# Patient Record
Sex: MALE | Race: WHITE | NOT HISPANIC OR LATINO | Employment: FULL TIME | URBAN - NONMETROPOLITAN AREA
[De-identification: names, ages, dates, MRNs, and addresses within clinical notes are randomized per-mention and may not be internally consistent; named-entity substitution may affect disease eponyms.]

---

## 2021-03-10 ENCOUNTER — IMMUNIZATION (OUTPATIENT)
Dept: VACCINE CLINIC | Facility: HOSPITAL | Age: 66
End: 2021-03-10

## 2021-03-10 PROCEDURE — 0001A: CPT | Performed by: INTERNAL MEDICINE

## 2021-03-10 PROCEDURE — 91300 HC SARSCOV02 VAC 30MCG/0.3ML IM: CPT | Performed by: INTERNAL MEDICINE

## 2021-03-31 ENCOUNTER — IMMUNIZATION (OUTPATIENT)
Dept: VACCINE CLINIC | Facility: HOSPITAL | Age: 66
End: 2021-03-31

## 2021-03-31 PROCEDURE — 91300 HC SARSCOV02 VAC 30MCG/0.3ML IM: CPT | Performed by: INTERNAL MEDICINE

## 2021-03-31 PROCEDURE — 0002A: CPT | Performed by: INTERNAL MEDICINE

## 2021-08-12 ENCOUNTER — OFFICE VISIT (OUTPATIENT)
Dept: PULMONOLOGY | Facility: CLINIC | Age: 66
End: 2021-08-12

## 2021-08-12 VITALS
HEART RATE: 54 BPM | DIASTOLIC BLOOD PRESSURE: 72 MMHG | BODY MASS INDEX: 29.35 KG/M2 | SYSTOLIC BLOOD PRESSURE: 116 MMHG | OXYGEN SATURATION: 98 % | WEIGHT: 205 LBS | HEIGHT: 70 IN | RESPIRATION RATE: 18 BRPM

## 2021-08-12 DIAGNOSIS — G47.19 EXCESSIVE DAYTIME SLEEPINESS: ICD-10-CM

## 2021-08-12 DIAGNOSIS — J30.89 OTHER ALLERGIC RHINITIS: ICD-10-CM

## 2021-08-12 DIAGNOSIS — F17.200 SMOKING: ICD-10-CM

## 2021-08-12 DIAGNOSIS — Z87.891 PERSONAL HISTORY OF TOBACCO USE, PRESENTING HAZARDS TO HEALTH: ICD-10-CM

## 2021-08-12 DIAGNOSIS — G47.33 OBSTRUCTIVE SLEEP APNEA: Primary | ICD-10-CM

## 2021-08-12 DIAGNOSIS — R06.83 SNORING: ICD-10-CM

## 2021-08-12 PROCEDURE — 99204 OFFICE O/P NEW MOD 45 MIN: CPT | Performed by: INTERNAL MEDICINE

## 2021-08-12 RX ORDER — ATORVASTATIN CALCIUM 20 MG/1
TABLET, FILM COATED ORAL
COMMUNITY
Start: 2021-08-01 | End: 2022-11-29

## 2021-08-12 RX ORDER — AZELASTINE 1 MG/ML
1 SPRAY, METERED NASAL 2 TIMES DAILY PRN
Qty: 1 EACH | Refills: 5 | Status: SHIPPED | OUTPATIENT
Start: 2021-08-12 | End: 2022-11-29

## 2021-08-12 RX ORDER — ALFUZOSIN HYDROCHLORIDE 10 MG/1
10 TABLET, EXTENDED RELEASE ORAL DAILY
COMMUNITY
Start: 2021-07-02

## 2021-08-12 NOTE — PROGRESS NOTES
CONSULT NOTE    Requested by:   Shima Smith DO Kendrick, Casandra R, DO      Chief Complaint   Patient presents with   • Consult   • Sleeping Problem       Subjective:  Serge Rogers is a 66 y.o. male.     History of Present Illness   Patient came in today for evaluation of possible sleep apnea. Patient says that for the past few years he snores loudly.    he feels that he gets restful night sleep and his quality of sleep is pretty good.     he is not complaining of occasional headaches. Patient denies having nights when he has nightmares or when he sleep talks.     There is no known family history of sleep apnea.    his Epsworth Sleepiness score was 5 /24.     Patient does not suffer from hypertension or diabetes.     Smokes 1 PPD for about 35 years and still smokes.     He is a mouth breather.     Patient also complains of runny nose and dribbling in the back of the throat for the past few months. This has been sometimes associated with seasonal variation.      The following portions of the patient's history were reviewed and updated as appropriate: allergies, current medications, past family history, past medical history, past social history and past surgical history.    Review of Systems   Constitutional: Negative for chills, fatigue and fever.   HENT: Positive for sinus pressure and sneezing. Negative for sore throat.    Respiratory: Positive for cough. Negative for chest tightness, shortness of breath and wheezing.    Cardiovascular: Negative for palpitations and leg swelling.   Psychiatric/Behavioral: Positive for sleep disturbance.   All other systems reviewed and are negative.      History reviewed. No pertinent past medical history.    Social History     Tobacco Use   • Smoking status: Current Every Day Smoker     Packs/day: 1.00     Years: 15.00     Pack years: 15.00     Types: Cigarettes   • Smokeless tobacco: Never Used   • Tobacco comment: on and off 45 years   Substance Use Topics   •  "Alcohol use: Not on file         Objective:  Visit Vitals  /72   Pulse 54   Resp 18   Ht 177.8 cm (70\")   Wt 93 kg (205 lb)   SpO2 98%   BMI 29.41 kg/m²       Physical Exam  Vitals reviewed.   Constitutional:       Appearance: He is well-developed.   HENT:      Head: Atraumatic.      Nose:      Comments: Nasal erythema noted.     Mouth/Throat:      Comments: Oropharynx was somewhat cobblestoned.  Eyes:      Pupils: Pupils are equal, round, and reactive to light.   Neck:      Thyroid: No thyromegaly.      Vascular: No JVD.      Trachea: No tracheal deviation.      Comments: Increased adipose tissue.   Cardiovascular:      Rate and Rhythm: Normal rate and regular rhythm.   Pulmonary:      Effort: Pulmonary effort is normal. No respiratory distress.      Breath sounds: Normal breath sounds. No wheezing.   Musculoskeletal:         General: Normal range of motion.      Comments: Gait was normal.   Skin:     General: Skin is warm and dry.   Neurological:      Mental Status: He is alert and oriented to person, place, and time.   Psychiatric:         Behavior: Behavior normal.           Assessment/Plan:  Diagnoses and all orders for this visit:    1. Obstructive sleep apnea (Primary)  -     Home Sleep Study; Future    2. Snoring  -     Home Sleep Study; Future    3. Excessive daytime sleepiness  -     Home Sleep Study; Future    4. Personal history of tobacco use, presenting hazards to health    5. Smoking    6. Other allergic rhinitis    Other orders  -     azelastine (ASTELIN) 0.1 % nasal spray; 1 spray into the nostril(s) as directed by provider 2 (Two) Times a Day As Needed for Rhinitis or Allergies. Use in each nostril as directed  Dispense: 1 each; Refill: 5        Return in about 4 months (around 12/12/2021) for Sleep study, Arlene/Mary.    DISCUSSION(if any):  Referring physicians office note was also reviewed that did mention sleep apnea.    ===========================  " ===========================    Sleep questionnaire was reviewed with the patient    The pathophysiology of sleep apnea was discussed, with the patient.     We will encourage him to schedule the sleep study soon.     Since his symptoms are not entirely classic for sleep apnea, we will only do a home sleep study and if negative, no lab study may be needed.     The patient is agreeable to try CPAP/BiPAP, if needed.     Patient was educated on good sleep hygiene measures and voiced understanding of the same.     Patient was given reading material regarding sleep apnea    Patient was strongly encouraged to quit smoking as soon as possible.    Patient will be started on nasal spray for symptoms which are definitely consistent with allergic rhinitis.     The patient belongs to the risk group for which lung cancer screening has been recommended. I have made the patient aware of my strong recommendation to discuss this further with primary care provider.        Dictated utilizing Dragon dictation.    This document was electronically signed by Gisela Villeda MD on 08/12/21 at 12:00 EDT

## 2022-11-29 ENCOUNTER — OFFICE VISIT (OUTPATIENT)
Dept: GASTROENTEROLOGY | Facility: CLINIC | Age: 67
End: 2022-11-29

## 2022-11-29 VITALS
HEART RATE: 71 BPM | WEIGHT: 194 LBS | BODY MASS INDEX: 27.77 KG/M2 | OXYGEN SATURATION: 98 % | SYSTOLIC BLOOD PRESSURE: 132 MMHG | HEIGHT: 70 IN | DIASTOLIC BLOOD PRESSURE: 80 MMHG

## 2022-11-29 DIAGNOSIS — R13.19 ESOPHAGEAL DYSPHAGIA: Primary | ICD-10-CM

## 2022-11-29 DIAGNOSIS — Z86.010 HISTORY OF COLON POLYPS: ICD-10-CM

## 2022-11-29 PROCEDURE — 99204 OFFICE O/P NEW MOD 45 MIN: CPT | Performed by: PHYSICIAN ASSISTANT

## 2022-11-29 RX ORDER — LOSARTAN POTASSIUM 50 MG/1
TABLET ORAL
COMMUNITY
Start: 2022-06-01 | End: 2022-11-29

## 2022-11-29 RX ORDER — LOSARTAN POTASSIUM 50 MG/1
50 TABLET ORAL DAILY
COMMUNITY
Start: 2022-09-08

## 2022-11-29 RX ORDER — SODIUM CHLORIDE 9 MG/ML
30 INJECTION, SOLUTION INTRAVENOUS CONTINUOUS PRN
Status: CANCELLED | OUTPATIENT
Start: 2022-11-29

## 2022-11-29 RX ORDER — MULTIPLE VITAMINS W/ MINERALS TAB 9MG-400MCG
1 TAB ORAL DAILY
COMMUNITY

## 2022-11-29 RX ORDER — ATORVASTATIN CALCIUM 10 MG/1
10 TABLET, FILM COATED ORAL NIGHTLY
COMMUNITY
Start: 2022-11-02

## 2022-11-29 NOTE — PROGRESS NOTES
"     New Patient Consult      Date: 2022   Patient Name: Serge Rogers  MRN: [id number]  : 1955     Primary Care Provider: Shima Smith DO  Referring Provider: Luis    Chief Complaint   Patient presents with   • Difficulty Swallowing     History of Present Illness: Serge Rogers is a 67 y.o. male who is here today as a consultation with Gastroenterology for evaluation of Difficulty Swallowing.    He has noticed dysphagia for the past approx 20 years, this only occurs intermittently and is about the same as when it started. Has noticed that this seems to occur with dry meats or uncooked vegetables. It does sometimes occur more often than others but then may not occur for months. He feels the sensation at the base of his neck. It feels \"full\" there at times and like the food stops. Most of the time, it will go on down without problem but other times, he will have regurgitation of the food. Denies any significant reflux or heartburn. Has this very rarely depending on his diet.     No previous EGD. Had recent labs with PCP recently and told all normal. No history of any liver disease. No anemia. No current abdominal pain. Appetite is good. Weight is stable. He generally has a healthy low cholesterol diet and exercises daily (walks approx 2 miles per day). He is a current smoker. Drinks alcohol regularly but not daily. No reported history of alcoholism.     Last colonoscopy was in 2018, was reported as normal. Does have a personal history of colon polyps when he had his initial colonoscopy. He was told to repeat in 10 years. There is no known family history of colon cancer or colon polyps.    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   • RETINAL " LASER PROCEDURE      torn retina     Family History   Problem Relation Age of Onset   • Cancer Mother         breast   • Hypertension Mother    • Heart disease Mother      Social History     Socioeconomic History   • Marital status:    Tobacco Use   • Smoking status: Every Day     Packs/day: 1.00     Years: 30.00     Pack years: 30.00     Types: Cigarettes     Start date: 8/1/1974   • Smokeless tobacco: Never   • Tobacco comments:     Smoked 10 years, quit 12 years, [id number] years, quit 7 years, smoked last 10 years   Vaping Use   • Vaping Use: Never used   Substance and Sexual Activity   • Alcohol use: Yes     Alcohol/week: 5.0 standard drinks     Types: 3 Cans of beer, 2 Shots of liquor per week   • Drug use: Never   • Sexual activity: Defer     Current Outpatient Medications:   •  alfuzosin (UROXATRAL) 10 MG 24 hr tablet, , Disp: , Rfl:   •  atorvastatin (LIPITOR) 10 MG tablet, , Disp: , Rfl:   •  losartan (COZAAR) 50 MG tablet, , Disp: , Rfl:   •  multivitamin with minerals tablet tablet, Take 1 tablet by mouth Daily., Disp: , Rfl:     No Known Allergies    The following portions of the patient's history were reviewed and updated as appropriate: allergies, current medications, past family history, past medical history, past social history, past surgical history and problem list.    Objective     Physical Exam  Vitals reviewed.   Constitutional:       General: He is not in acute distress.     Appearance: Normal appearance. He is well-developed. He is not ill-appearing or diaphoretic.   HENT:      Head: Normocephalic and atraumatic.      Right Ear: External ear normal.      Left Ear: External ear normal.      Nose: Nose normal.      Mouth/Throat:      Comments: Wearing a mask  Eyes:      General: No scleral icterus.        Right eye: No discharge.         Left eye: No discharge.      Conjunctiva/sclera: Conjunctivae normal.      Comments: Ptosis right   Neck:      Vascular: No JVD.   Cardiovascular:       "Rate and Rhythm: Normal rate and regular rhythm.      Heart sounds: Normal heart sounds. No murmur heard.    No friction rub. No gallop.   Pulmonary:      Effort: Pulmonary effort is normal. No respiratory distress.      Breath sounds: Normal breath sounds. No wheezing or rales.   Chest:      Chest wall: No tenderness.   Abdominal:      General: Bowel sounds are normal. There is no distension.      Palpations: Abdomen is soft. There is no mass.      Tenderness: There is no abdominal tenderness. There is no guarding.   Musculoskeletal:         General: No deformity. Normal range of motion.      Cervical back: Normal range of motion.   Skin:     General: Skin is warm and dry.      Findings: No erythema or rash.   Neurological:      Mental Status: He is alert and oriented to person, place, and time.      Coordination: Coordination normal.   Psychiatric:         Mood and Affect: Mood normal.         Behavior: Behavior normal.         Thought Content: Thought content normal.         Judgment: Judgment normal.         Vitals:    11/29/22 1306   BP: 132/80   Pulse: 71   SpO2: 98%   Weight: 88 kg (194 lb)   Height: 177.8 cm (70\")     Results Review:   I have reviewed the patient's new clinical and imaging results.    Labs 11/2022: CBC normal, Cr 0.8, Bili 0.8, alk phos 94, AST 19, ALT 19, , total cholesterol 159.     CT chest 11/2022 normal liver.     Assessment / Plan      1. Esophageal dysphagia  He has been having mild and intermittent dysphagia for approx 20 years. He notices this occurs more often with meats or uncooked vegetables. This will occur for a couple of days in a row or occur rarely. He has intermittent regurgitation with this. No reported reflux symptoms. No change in weight or appetite. No associated abdominal pain and no other GI complaints. Basic labs normal 11/2022. No recent abdominal imaging to review. No prior EGD. Dysphagia should be evaluated due to it's persistence, differential diagnoses " include esophageal stenosis, Eoe, esophageal dysmotility, etc.     EGD will be arranged  No reflux medications at this time  Acid reflux measures    He will need an esophagogastroduodenoscopy with possible dilatation of the esophagus performed with monitored anesthesia care. The indications, technique, alternatives and potential risk and complications were discussed with the patient including but not limited to bleeding, bowel perforations, missing lesions and anesthetic complications. The patient understands and wishes to proceed with the procedure and has given their verbal consent. Written patient education information was given to the patient. He should follow up in the office after this procedure to discuss the results and further recommendations can be made at that time. The patient will call if they have further questions before procedure.  - Case Request    2. History of colon polyps  His last colonoscopy was approx 2018 and reported as normal. He had colon polyps removed on his initial colonoscopy years ago. There is no known family history of colon cancer or colon polyps.   He plans to repeat colonoscopy in 10 years as recommended previously, due 2028.           Follow Up:   Return for follow up after procedure to discuss results.      Sivan Townsend PA-C  [facility]  11/29/2022  13:58 EST    Dictated Utilizing Dragon Dictation: Part of this note may be an electronic transcription/translation of spoken language to printed text using the Dragon Dictation System.

## 2022-12-02 PROBLEM — R13.19 ESOPHAGEAL DYSPHAGIA: Status: ACTIVE | Noted: 2022-12-02

## 2023-02-20 ENCOUNTER — ANESTHESIA EVENT (OUTPATIENT)
Dept: GASTROENTEROLOGY | Facility: HOSPITAL | Age: 68
End: 2023-02-20
Payer: MEDICARE

## 2023-02-20 ENCOUNTER — HOSPITAL ENCOUNTER (OUTPATIENT)
Facility: HOSPITAL | Age: 68
Setting detail: HOSPITAL OUTPATIENT SURGERY
Discharge: HOME | End: 2023-02-20
Attending: INTERNAL MEDICINE | Admitting: INTERNAL MEDICINE
Payer: MEDICARE

## 2023-02-20 ENCOUNTER — ANESTHESIA (OUTPATIENT)
Dept: GASTROENTEROLOGY | Facility: HOSPITAL | Age: 68
End: 2023-02-20
Payer: MEDICARE

## 2023-02-20 VITALS
SYSTOLIC BLOOD PRESSURE: 167 MMHG | OXYGEN SATURATION: 97 % | HEIGHT: 70 IN | HEART RATE: 56 BPM | DIASTOLIC BLOOD PRESSURE: 81 MMHG | TEMPERATURE: 97.8 F | RESPIRATION RATE: 20 BRPM | BODY MASS INDEX: 27.2 KG/M2 | WEIGHT: 190 LBS

## 2023-02-20 DIAGNOSIS — R13.10 DYSPHAGIA, UNSPECIFIED TYPE: Primary | ICD-10-CM

## 2023-02-20 DIAGNOSIS — R13.19 ESOPHAGEAL DYSPHAGIA: ICD-10-CM

## 2023-02-20 PROCEDURE — 43239 EGD BIOPSY SINGLE/MULTIPLE: CPT | Performed by: INTERNAL MEDICINE

## 2023-02-20 PROCEDURE — 25010000002 PROPOFOL 200 MG/20ML EMULSION: Performed by: NURSE ANESTHETIST, CERTIFIED REGISTERED

## 2023-02-20 PROCEDURE — 88305 TISSUE EXAM BY PATHOLOGIST: CPT

## 2023-02-20 RX ORDER — SODIUM CHLORIDE 9 MG/ML
INJECTION, SOLUTION INTRAVENOUS CONTINUOUS PRN
Status: DISCONTINUED | OUTPATIENT
Start: 2023-02-20 | End: 2023-02-20 | Stop reason: SURG

## 2023-02-20 RX ORDER — SODIUM CHLORIDE 9 MG/ML
30 INJECTION, SOLUTION INTRAVENOUS CONTINUOUS PRN
Status: DISCONTINUED | OUTPATIENT
Start: 2023-02-20 | End: 2023-02-20 | Stop reason: HOSPADM

## 2023-02-20 RX ORDER — PANTOPRAZOLE SODIUM 40 MG/1
40 TABLET, DELAYED RELEASE ORAL DAILY
Qty: 30 TABLET | Refills: 2 | Status: SHIPPED | OUTPATIENT
Start: 2023-02-20

## 2023-02-20 RX ORDER — PROPOFOL 10 MG/ML
INJECTION, EMULSION INTRAVENOUS AS NEEDED
Status: DISCONTINUED | OUTPATIENT
Start: 2023-02-20 | End: 2023-02-20 | Stop reason: SURG

## 2023-02-20 RX ORDER — LIDOCAINE HYDROCHLORIDE 20 MG/ML
INJECTION, SOLUTION INTRAVENOUS AS NEEDED
Status: DISCONTINUED | OUTPATIENT
Start: 2023-02-20 | End: 2023-02-20 | Stop reason: SURG

## 2023-02-20 RX ADMIN — SODIUM CHLORIDE: 9 INJECTION, SOLUTION INTRAVENOUS at 09:53

## 2023-02-20 RX ADMIN — PROPOFOL 100 MG: 10 INJECTION, EMULSION INTRAVENOUS at 09:58

## 2023-02-20 RX ADMIN — SODIUM CHLORIDE 30 ML/HR: 9 INJECTION, SOLUTION INTRAVENOUS at 08:43

## 2023-02-20 RX ADMIN — LIDOCAINE HYDROCHLORIDE 60 MG: 20 INJECTION, SOLUTION INTRAVENOUS at 09:58

## 2023-02-20 NOTE — DISCHARGE INSTRUCTIONS
- Discharge patient to home (ambulatory).   - Resume previous diet.   - Continue present medications.   - PPI 8-12 week   - Esophagogram as op   - Await pathology results.   - Return to my office in 8 weeks.     Rest today  No pushing,pulling,tugging,heavy lifting, or strenuous activity   No major decision making,driving,or drinking alcoholic beverages for 24 hours due to the sedation you received  Always use good hand hygiene/washing technique  No driving on pain medication.     To assist you in voiding:  Drink plenty of fluids  Listen to running water while attempting to void.    If you are unable to urinate and you have an uncomfortable urge to void or it has been   6 hours since you were discharged, return to the Emergency Room.

## 2023-02-20 NOTE — ANESTHESIA POSTPROCEDURE EVALUATION
Patient: Serge Rogers    Procedure Summary     Date: 02/20/23 Room / Location: [facility] ENDOSCOPY 1 / [facility] ENDOSCOPY    Anesthesia Start: 0953 Anesthesia Stop: 1008    Procedure: ESOPHAGOGASTRODUODENOSCOPY with biopsy (Esophagus) Diagnosis:       Esophageal dysphagia      (Esophageal dysphagia [R13.19])    Surgeons: Slim Kendrick MD Provider: Dimitri Cornejo CRNA    Anesthesia Type: MAC ASA Status: 2          Anesthesia Type: MAC    Vitals  No vitals data found for the desired time range.          Post Anesthesia Care and Evaluation    Patient location during evaluation: PHASE II  Patient participation: complete - patient participated  Level of consciousness: awake and alert  Pain score: 0  Pain management: adequate    Airway patency: patent  Anesthetic complications: No anesthetic complications  PONV Status: none  Cardiovascular status: acceptable  Respiratory status: acceptable  Hydration status: acceptable    Comments: See RN notes for vital signs  No anesthesia care post op

## 2023-02-20 NOTE — H&P
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   • RETINAL LASER PROCEDURE      torn retina       Social History     Socioeconomic History   • Marital status:    Tobacco Use   • Smoking status: Every Day     Packs/day: 1.00     Years: 30.00     Pack years: 30.00     Types: Cigarettes     Start date: 1974   • Smokeless tobacco: Never   • Tobacco comments:     Smoked 10 years, quit 12 years, [id number] years, quit 7 years, smoked last 10 years   Vaping Use   • Vaping Use: Never used   Substance and Sexual Activity   • Alcohol use: Yes     Alcohol/week: 5.0 standard drinks     Types: 3 Cans of beer, 2 Shots of liquor per week   • Drug use: Never   • Sexual activity: Defer       Family History   Problem Relation Age of Onset   • Cancer Mother         breast   • Hypertension Mother    • Heart disease Mother        Prior to Admission Medications:  Medications Prior to Admission   Medication Sig Dispense Refill Last Dose   • alfuzosin (UROXATRAL) 10 MG 24 hr tablet Take 10 mg by mouth Daily.   2023 at 2200   • atorvastatin (LIPITOR) 10 MG tablet Take 10 mg by mouth Every Night.   2023 at 2200   • losartan (COZAAR) 50 MG tablet Take 50 mg by mouth Daily.   2023 at 2200   • multivitamin with minerals tablet tablet Take 1 tablet by mouth Daily.   2023 at 2200       Allergies:  No Known Allergies     Vitals: Temp:  [97.7 °F (36.5 °C)] 97.7 °F (36.5 °C)  Heart Rate:  [50] 50  Resp:  [16] 16  BP: (158)/(78) 158/78    Review  Of Systems:  Constitutional:  Negative for chills, fever, and unexpected weight change.  Respiratory:  Negative for cough, chest tightness, shortness of breath, and wheezing.  Cardiovascular:  Negative for chest pain, palpitations, and leg swelling.  Gastrointestinal:  Negative for abdominal distention, abdominal pain, nausea, vomiting.  Neurological:  Negative for weakness, numbness, and headaches.     Physical Exam:    General Appearance:  Alert, cooperative, in no acute distress.   Lungs:   Clear to auscultation, respirations regular, even and                 unlabored.   Heart:  Regular rhythm and normal rate.   Abdomen:   Normal bowel sounds, no masses, no organomegaly. Soft, nontender, nondistended   Neurologic: Alert and oriented x 3. Moves all four limbs equally       Assessment & Plan     Assessment:  Principal Problem:    Esophageal dysphagia      Plan: ESOPHAGOGASTRODUODENOSCOPY WITH DILATATION CPT CODE: 38136 (N/A)     Slim Kendrick MD  2/20/2023

## 2023-02-20 NOTE — ANESTHESIA PREPROCEDURE EVALUATION
Anesthesia Evaluation     Patient summary reviewed and Nursing notes reviewed   NPO Solid Status: > 8 hours  NPO Liquid Status: > 4 hours           Airway   Mallampati: I  TM distance: >3 FB  Neck ROM: full  No difficulty expected  Dental - normal exam     Pulmonary     breath sounds clear to auscultation  (+) a smoker Current, asthma,  Cardiovascular     Rhythm: regular  Rate: normal    (+) hypertension, hyperlipidemia,       Neuro/Psych- negative ROS  GI/Hepatic/Renal/Endo - negative ROS     Musculoskeletal (-) negative ROS    Abdominal    Substance History - negative use     OB/GYN          Other - negative ROS                       Anesthesia Plan    ASA 2     MAC     intravenous induction     Anesthetic plan, risks, benefits, and alternatives have been provided, discussed and informed consent has been obtained with: patient.  Pre-procedure education provided  Plan discussed with CRNA.        CODE STATUS:

## 2023-02-21 LAB — REF LAB TEST METHOD: NORMAL

## 2023-10-11 ENCOUNTER — HOSPITAL ENCOUNTER (OUTPATIENT)
Facility: HOSPITAL | Age: 68
Setting detail: HOSPITAL OUTPATIENT SURGERY
Discharge: HOME | End: 2023-10-11
Attending: UROLOGY | Admitting: UROLOGY
Payer: MEDICARE

## 2023-10-11 ENCOUNTER — ANESTHESIA EVENT (OUTPATIENT)
Dept: PERIOP | Facility: HOSPITAL | Age: 68
End: 2023-10-11
Payer: MEDICARE

## 2023-10-11 ENCOUNTER — ANESTHESIA (OUTPATIENT)
Dept: PERIOP | Facility: HOSPITAL | Age: 68
End: 2023-10-11
Payer: MEDICARE

## 2023-10-11 VITALS
OXYGEN SATURATION: 99 % | SYSTOLIC BLOOD PRESSURE: 164 MMHG | BODY MASS INDEX: 26.48 KG/M2 | TEMPERATURE: 97.5 F | RESPIRATION RATE: 16 BRPM | DIASTOLIC BLOOD PRESSURE: 88 MMHG | HEART RATE: 67 BPM | WEIGHT: 185 LBS | HEIGHT: 70 IN

## 2023-10-11 DIAGNOSIS — N13.8 BPH WITH OBSTRUCTION/LOWER URINARY TRACT SYMPTOMS: Primary | ICD-10-CM

## 2023-10-11 DIAGNOSIS — N40.0 BPH (BENIGN PROSTATIC HYPERPLASIA): ICD-10-CM

## 2023-10-11 DIAGNOSIS — N40.1 BPH WITH OBSTRUCTION/LOWER URINARY TRACT SYMPTOMS: Primary | ICD-10-CM

## 2023-10-11 LAB
ANION GAP SERPL CALCULATED.3IONS-SCNC: 8 MMOL/L (ref 5–15)
BUN SERPL-MCNC: 10 MG/DL (ref 8–23)
BUN/CREAT SERPL: 13.5 (ref 7–25)
CALCIUM SPEC-SCNC: 9.4 MG/DL (ref 8.6–10.5)
CHLORIDE SERPL-SCNC: 108 MMOL/L (ref 98–107)
CO2 SERPL-SCNC: 27 MMOL/L (ref 22–29)
CREAT SERPL-MCNC: 0.74 MG/DL (ref 0.76–1.27)
DEPRECATED RDW RBC AUTO: 45.2 FL (ref 37–54)
EGFRCR SERPLBLD CKD-EPI 2021: 99.3 ML/MIN/1.73
ERYTHROCYTE [DISTWIDTH] IN BLOOD BY AUTOMATED COUNT: 12.4 % (ref 12.3–15.4)
GLUCOSE SERPL-MCNC: 92 MG/DL (ref 65–99)
HCT VFR BLD AUTO: 46 % (ref 37.5–51)
HGB BLD-MCNC: 15.5 G/DL (ref 13–17.7)
MCH RBC QN AUTO: 32.8 PG (ref 26.6–33)
MCHC RBC AUTO-ENTMCNC: 33.7 G/DL (ref 31.5–35.7)
MCV RBC AUTO: 97.5 FL (ref 79–97)
PLATELET # BLD AUTO: 133 10*3/MM3 (ref 140–450)
PMV BLD AUTO: 10.9 FL (ref 6–12)
POTASSIUM SERPL-SCNC: 4.1 MMOL/L (ref 3.5–5.2)
QT INTERVAL: 484 MS
QTC INTERVAL: 432 MS
RBC # BLD AUTO: 4.72 10*6/MM3 (ref 4.14–5.8)
SODIUM SERPL-SCNC: 143 MMOL/L (ref 136–145)
WBC NRBC COR # BLD: 8.13 10*3/MM3 (ref 3.4–10.8)

## 2023-10-11 PROCEDURE — 25010000002 HYDRALAZINE PER 20 MG

## 2023-10-11 PROCEDURE — 25010000002 FENTANYL CITRATE (PF) 50 MCG/ML SOLUTION

## 2023-10-11 PROCEDURE — 25810000003 SODIUM CHLORIDE PER 500 ML: Performed by: UROLOGY

## 2023-10-11 PROCEDURE — 25010000002 ONDANSETRON PER 1 MG: Performed by: ANESTHESIOLOGY

## 2023-10-11 PROCEDURE — 25810000003 LACTATED RINGERS PER 1000 ML: Performed by: ANESTHESIOLOGY

## 2023-10-11 PROCEDURE — 25010000002 FENTANYL CITRATE (PF) 100 MCG/2ML SOLUTION: Performed by: ANESTHESIOLOGY

## 2023-10-11 PROCEDURE — 25010000002 PROPOFOL 10 MG/ML EMULSION: Performed by: ANESTHESIOLOGY

## 2023-10-11 PROCEDURE — 85027 COMPLETE CBC AUTOMATED: CPT | Performed by: ANESTHESIOLOGY

## 2023-10-11 PROCEDURE — 25010000002 DEXAMETHASONE PER 1 MG: Performed by: ANESTHESIOLOGY

## 2023-10-11 PROCEDURE — 93005 ELECTROCARDIOGRAM TRACING: CPT | Performed by: ANESTHESIOLOGY

## 2023-10-11 PROCEDURE — 80048 BASIC METABOLIC PNL TOTAL CA: CPT | Performed by: ANESTHESIOLOGY

## 2023-10-11 PROCEDURE — 25010000002 LEVOFLOXACIN PER 250 MG: Performed by: UROLOGY

## 2023-10-11 RX ORDER — LEVOFLOXACIN 5 MG/ML
500 INJECTION, SOLUTION INTRAVENOUS ONCE
Status: COMPLETED | OUTPATIENT
Start: 2023-10-11 | End: 2023-10-11

## 2023-10-11 RX ORDER — LIDOCAINE HYDROCHLORIDE 10 MG/ML
INJECTION, SOLUTION EPIDURAL; INFILTRATION; INTRACAUDAL; PERINEURAL AS NEEDED
Status: DISCONTINUED | OUTPATIENT
Start: 2023-10-11 | End: 2023-10-11 | Stop reason: SURG

## 2023-10-11 RX ORDER — MAGNESIUM HYDROXIDE 1200 MG/15ML
LIQUID ORAL AS NEEDED
Status: DISCONTINUED | OUTPATIENT
Start: 2023-10-11 | End: 2023-10-11 | Stop reason: HOSPADM

## 2023-10-11 RX ORDER — HYDROCODONE BITARTRATE AND ACETAMINOPHEN 7.5; 325 MG/1; MG/1
1 TABLET ORAL EVERY 4 HOURS PRN
Qty: 15 TABLET | Refills: 0 | Status: SHIPPED | OUTPATIENT
Start: 2023-10-11

## 2023-10-11 RX ORDER — SODIUM CHLORIDE 9 MG/ML
INJECTION, SOLUTION INTRAVENOUS AS NEEDED
Status: DISCONTINUED | OUTPATIENT
Start: 2023-10-11 | End: 2023-10-11 | Stop reason: HOSPADM

## 2023-10-11 RX ORDER — DOCUSATE SODIUM 250 MG
250 CAPSULE ORAL 2 TIMES DAILY PRN
Qty: 20 CAPSULE | Refills: 0 | Status: SHIPPED | OUTPATIENT
Start: 2023-10-11

## 2023-10-11 RX ORDER — FENTANYL CITRATE 50 UG/ML
50 INJECTION, SOLUTION INTRAMUSCULAR; INTRAVENOUS
Status: DISCONTINUED | OUTPATIENT
Start: 2023-10-11 | End: 2023-10-11 | Stop reason: HOSPADM

## 2023-10-11 RX ORDER — HYDROCODONE BITARTRATE AND ACETAMINOPHEN 7.5; 325 MG/1; MG/1
TABLET ORAL
Status: COMPLETED
Start: 2023-10-11 | End: 2023-10-11

## 2023-10-11 RX ORDER — FENTANYL CITRATE 50 UG/ML
INJECTION, SOLUTION INTRAMUSCULAR; INTRAVENOUS
Status: COMPLETED
Start: 2023-10-11 | End: 2023-10-11

## 2023-10-11 RX ORDER — SODIUM CHLORIDE 0.9 % (FLUSH) 0.9 %
10 SYRINGE (ML) INJECTION EVERY 12 HOURS SCHEDULED
Status: CANCELLED | OUTPATIENT
Start: 2023-10-11

## 2023-10-11 RX ORDER — FENTANYL CITRATE 50 UG/ML
INJECTION, SOLUTION INTRAMUSCULAR; INTRAVENOUS AS NEEDED
Status: DISCONTINUED | OUTPATIENT
Start: 2023-10-11 | End: 2023-10-11 | Stop reason: SURG

## 2023-10-11 RX ORDER — ONDANSETRON 2 MG/ML
INJECTION INTRAMUSCULAR; INTRAVENOUS AS NEEDED
Status: DISCONTINUED | OUTPATIENT
Start: 2023-10-11 | End: 2023-10-11 | Stop reason: SURG

## 2023-10-11 RX ORDER — LIDOCAINE HYDROCHLORIDE 10 MG/ML
0.5 INJECTION, SOLUTION EPIDURAL; INFILTRATION; INTRACAUDAL; PERINEURAL ONCE AS NEEDED
Status: COMPLETED | OUTPATIENT
Start: 2023-10-11 | End: 2023-10-11

## 2023-10-11 RX ORDER — PHENYLEPHRINE HCL IN 0.9% NACL 1 MG/10 ML
SYRINGE (ML) INTRAVENOUS AS NEEDED
Status: DISCONTINUED | OUTPATIENT
Start: 2023-10-11 | End: 2023-10-11 | Stop reason: SURG

## 2023-10-11 RX ORDER — DEXAMETHASONE SODIUM PHOSPHATE 4 MG/ML
INJECTION, SOLUTION INTRA-ARTICULAR; INTRALESIONAL; INTRAMUSCULAR; INTRAVENOUS; SOFT TISSUE AS NEEDED
Status: DISCONTINUED | OUTPATIENT
Start: 2023-10-11 | End: 2023-10-11 | Stop reason: SURG

## 2023-10-11 RX ORDER — GLYCINE 1.5 G/100ML
SOLUTION IRRIGATION AS NEEDED
Status: DISCONTINUED | OUTPATIENT
Start: 2023-10-11 | End: 2023-10-11 | Stop reason: HOSPADM

## 2023-10-11 RX ORDER — HYDROMORPHONE HYDROCHLORIDE 1 MG/ML
0.5 INJECTION, SOLUTION INTRAMUSCULAR; INTRAVENOUS; SUBCUTANEOUS
Status: DISCONTINUED | OUTPATIENT
Start: 2023-10-11 | End: 2023-10-11 | Stop reason: HOSPADM

## 2023-10-11 RX ORDER — HYDROCODONE BITARTRATE AND ACETAMINOPHEN 7.5; 325 MG/1; MG/1
1 TABLET ORAL EVERY 6 HOURS PRN
Status: DISCONTINUED | OUTPATIENT
Start: 2023-10-11 | End: 2023-10-11 | Stop reason: HOSPADM

## 2023-10-11 RX ORDER — EPHEDRINE SULFATE 50 MG/ML
INJECTION, SOLUTION INTRAVENOUS AS NEEDED
Status: DISCONTINUED | OUTPATIENT
Start: 2023-10-11 | End: 2023-10-11 | Stop reason: SURG

## 2023-10-11 RX ORDER — SODIUM CHLORIDE 9 MG/ML
40 INJECTION, SOLUTION INTRAVENOUS AS NEEDED
Status: CANCELLED | OUTPATIENT
Start: 2023-10-11

## 2023-10-11 RX ORDER — FAMOTIDINE 10 MG/ML
20 INJECTION, SOLUTION INTRAVENOUS ONCE
Status: CANCELLED | OUTPATIENT
Start: 2023-10-11 | End: 2023-10-11

## 2023-10-11 RX ORDER — SODIUM CHLORIDE, SODIUM LACTATE, POTASSIUM CHLORIDE, CALCIUM CHLORIDE 600; 310; 30; 20 MG/100ML; MG/100ML; MG/100ML; MG/100ML
9 INJECTION, SOLUTION INTRAVENOUS CONTINUOUS
Status: DISCONTINUED | OUTPATIENT
Start: 2023-10-11 | End: 2023-10-11 | Stop reason: HOSPADM

## 2023-10-11 RX ORDER — PROPOFOL 10 MG/ML
VIAL (ML) INTRAVENOUS AS NEEDED
Status: DISCONTINUED | OUTPATIENT
Start: 2023-10-11 | End: 2023-10-11 | Stop reason: SURG

## 2023-10-11 RX ORDER — HYDRALAZINE HYDROCHLORIDE 20 MG/ML
10 INJECTION INTRAMUSCULAR; INTRAVENOUS EVERY 4 HOURS PRN
Status: DISCONTINUED | OUTPATIENT
Start: 2023-10-11 | End: 2023-10-11 | Stop reason: HOSPADM

## 2023-10-11 RX ORDER — SODIUM CHLORIDE 0.9 % (FLUSH) 0.9 %
10 SYRINGE (ML) INJECTION AS NEEDED
Status: CANCELLED | OUTPATIENT
Start: 2023-10-11

## 2023-10-11 RX ORDER — GLYCOPYRROLATE 0.2 MG/ML
INJECTION INTRAMUSCULAR; INTRAVENOUS AS NEEDED
Status: DISCONTINUED | OUTPATIENT
Start: 2023-10-11 | End: 2023-10-11 | Stop reason: SURG

## 2023-10-11 RX ORDER — HYDRALAZINE HYDROCHLORIDE 20 MG/ML
INJECTION INTRAMUSCULAR; INTRAVENOUS
Status: COMPLETED
Start: 2023-10-11 | End: 2023-10-11

## 2023-10-11 RX ORDER — FAMOTIDINE 20 MG/1
20 TABLET, FILM COATED ORAL ONCE
Status: COMPLETED | OUTPATIENT
Start: 2023-10-11 | End: 2023-10-11

## 2023-10-11 RX ORDER — LIDOCAINE HYDROCHLORIDE 20 MG/ML
JELLY TOPICAL AS NEEDED
Status: DISCONTINUED | OUTPATIENT
Start: 2023-10-11 | End: 2023-10-11 | Stop reason: HOSPADM

## 2023-10-11 RX ORDER — MIDAZOLAM HYDROCHLORIDE 1 MG/ML
0.5 INJECTION INTRAMUSCULAR; INTRAVENOUS
Status: DISCONTINUED | OUTPATIENT
Start: 2023-10-11 | End: 2023-10-11 | Stop reason: HOSPADM

## 2023-10-11 RX ADMIN — PROPOFOL 30 MG: 10 INJECTION, EMULSION INTRAVENOUS at 16:43

## 2023-10-11 RX ADMIN — LIDOCAINE HYDROCHLORIDE 0.5 ML: 10 INJECTION, SOLUTION EPIDURAL; INFILTRATION; INTRACAUDAL; PERINEURAL at 13:43

## 2023-10-11 RX ADMIN — DEXAMETHASONE SODIUM PHOSPHATE 8 MG: 4 INJECTION, SOLUTION INTRAMUSCULAR; INTRAVENOUS at 15:20

## 2023-10-11 RX ADMIN — SODIUM CHLORIDE, POTASSIUM CHLORIDE, SODIUM LACTATE AND CALCIUM CHLORIDE 9 ML/HR: 600; 310; 30; 20 INJECTION, SOLUTION INTRAVENOUS at 13:43

## 2023-10-11 RX ADMIN — FAMOTIDINE 20 MG: 20 TABLET, FILM COATED ORAL at 13:43

## 2023-10-11 RX ADMIN — Medication 50 MCG: at 15:50

## 2023-10-11 RX ADMIN — HYDROCODONE BITARTRATE AND ACETAMINOPHEN 1 TABLET: 7.5; 325 TABLET ORAL at 20:02

## 2023-10-11 RX ADMIN — GLYCOPYRROLATE 0.1 MG: 0.2 INJECTION INTRAMUSCULAR; INTRAVENOUS at 15:39

## 2023-10-11 RX ADMIN — HYDRALAZINE HYDROCHLORIDE 10 MG: 20 INJECTION INTRAMUSCULAR; INTRAVENOUS at 18:05

## 2023-10-11 RX ADMIN — LEVOFLOXACIN 500 MG: 5 INJECTION, SOLUTION INTRAVENOUS at 15:26

## 2023-10-11 RX ADMIN — GLYCOPYRROLATE 0.2 MG: 0.2 INJECTION INTRAMUSCULAR; INTRAVENOUS at 15:26

## 2023-10-11 RX ADMIN — FENTANYL CITRATE 25 MCG: 50 INJECTION, SOLUTION INTRAMUSCULAR; INTRAVENOUS at 16:27

## 2023-10-11 RX ADMIN — EPHEDRINE SULFATE 10 MG: 50 INJECTION INTRAVENOUS at 15:49

## 2023-10-11 RX ADMIN — EPHEDRINE SULFATE 5 MG: 50 INJECTION INTRAVENOUS at 15:27

## 2023-10-11 RX ADMIN — FENTANYL CITRATE 50 MCG: 50 INJECTION, SOLUTION INTRAMUSCULAR; INTRAVENOUS at 18:12

## 2023-10-11 RX ADMIN — ONDANSETRON 4 MG: 2 INJECTION INTRAMUSCULAR; INTRAVENOUS at 16:28

## 2023-10-11 RX ADMIN — PROPOFOL 30 MG: 10 INJECTION, EMULSION INTRAVENOUS at 16:27

## 2023-10-11 RX ADMIN — LIDOCAINE HYDROCHLORIDE 50 MG: 10 INJECTION, SOLUTION EPIDURAL; INFILTRATION; INTRACAUDAL; PERINEURAL at 15:20

## 2023-10-11 RX ADMIN — PROPOFOL 200 MG: 10 INJECTION, EMULSION INTRAVENOUS at 15:20

## 2023-10-11 RX ADMIN — FENTANYL CITRATE 25 MCG: 50 INJECTION, SOLUTION INTRAMUSCULAR; INTRAVENOUS at 15:56

## 2023-10-11 RX ADMIN — PROPOFOL 30 MG: 10 INJECTION, EMULSION INTRAVENOUS at 15:56

## 2023-10-11 RX ADMIN — FENTANYL CITRATE 50 MCG: 50 INJECTION, SOLUTION INTRAMUSCULAR; INTRAVENOUS at 15:20

## 2023-10-11 NOTE — ANESTHESIA PROCEDURE NOTES
Airway  Urgency: elective    Date/Time: 10/11/2023 3:21 PM  Airway not difficult    General Information and Staff    Patient location during procedure: OR  SRNA: Kerline Hodges SRNA  Indications and Patient Condition  Indications for airway management: airway protection    Preoxygenated: yes  Mask difficulty assessment: 0 - not attempted    Final Airway Details  Final airway type: supraglottic airway      Successful airway: I-gel  Size 5     Number of attempts at approach: 1  Assessment: lips, teeth, and gum same as pre-op    Additional Comments  LMA placed without difficulty, ventilation with assist, equal breath sounds and symmetric chest rise and fall           Miralax refill ready to be signed. Last visit: 4/17/18    No show 6/18/18    No f/u scheduled.

## 2023-10-11 NOTE — ANESTHESIA POSTPROCEDURE EVALUATION
Patient: Serge Rogers    Procedure Summary       Date: 10/11/23 Room / Location:  [facility] /  [facility]    Anesthesia Start: 1516 Anesthesia Stop: 1711    Procedure: CYSTOSCOPY TRANSURETHRAL RESECTION OF PROSTATE GREENLIGHT Diagnosis:     Surgeons: Paco Gunderson MD Provider: Lonnie Linares MD    Anesthesia Type: general ASA Status: 2            Anesthesia Type: general    Vitals  No vitals data found for the desired time range.          Post Anesthesia Care and Evaluation    Patient location during evaluation: PACU  Patient participation: complete - patient participated  Level of consciousness: awake and alert  Pain management: adequate    Airway patency: patent  Anesthetic complications: No anesthetic complications  PONV Status: none  Cardiovascular status: hemodynamically stable and acceptable  Respiratory status: nonlabored ventilation, acceptable and nasal cannula  Hydration status: acceptable

## 2023-10-11 NOTE — H&P
Pre-Op H&P  Serge Rogers  [id number]  1955      Chief complaint: Urinary Frequency      Subjective:  Patient is a 68 y.o.male presents for scheduled surgery by Dr. Gunderson. He anticipates a CYSTOSCOPY TRANSURETHRAL RESECTION OF PROSTATE GREENLIGHT today.  The patient endorses having urinary frequency and urgency issues for approximately the past 5 years.  He mentioned having to get up multiple times at night, sometimes every 1-2 hours.  This has affected his ability to sleep at night.  He has been taking alfuzosin, which has helped somewhat to alleviate his symptoms.    Review of Systems:  Constitutional-- No fever, chills or sweats. No fatigue.  CV-- No chest pain, palpitation or syncope. +HTN, HLD.  Resp-- No SOB, cough, hemoptysis  Skin--No rashes or lesions      Allergies: No Known Allergies      Home Meds:  Medications Prior to Admission   Medication Sig Dispense Refill Last Dose    alfuzosin (UROXATRAL) 10 MG 24 hr tablet Take 1 tablet by mouth Daily.   10/10/2023 at 2200    atorvastatin (LIPITOR) 10 MG tablet Take 1 tablet by mouth Every Night.   10/10/2023 at 2200    Cod Liver Oil 1000 MG capsule Take  by mouth.   10/10/2023 at 1200    losartan (COZAAR) 50 MG tablet Take 1 tablet by mouth Daily.   10/10/2023 at 2200    multivitamin with minerals tablet tablet Take 1 tablet by mouth Daily.   10/10/2023 at 1200    pantoprazole (PROTONIX) 40 MG EC tablet Take 1 tablet by mouth Daily. 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    RETINAL LASER PROCEDURE      torn retina       Immunization History:  Influenza: 2023  Pneumococcal: Yes  Tetanus: Yes  Covid : x4    Social History:   Tobacco:   Social History     Tobacco Use   Smoking Status Every Day    Packs/day: 1.00    Years: 30.00    Additional pack years: 0.00    Total pack years: 30.00    Types: Cigarettes    Start date: 8/1/1974   Smokeless Tobacco Never   Tobacco Comments    Smoked 10 years, quit 12 years, [id number] years, quit 7 years, smoked last 10 years      Alcohol:     Social History     Substance and Sexual Activity   Alcohol Use Yes    Alcohol/week: 5.0 standard drinks of alcohol    Types: 3 Cans of beer, 2 Shots of liquor per week         Physical Exam:  BP: 143/93  HR: 47  RR: 16  SPO2: 99% on RA  Temp: 97.8    General Appearance:    Alert, cooperative, no distress, appears stated age   Head:    Normocephalic, without obvious abnormality, atraumatic   Lungs:     Clear to auscultation bilaterally, respirations unlabored    Heart:   Regular rhythm, S1 and S2 normal. Bradycardic.    Abdomen:    Soft without tenderness   Extremities:   Extremities normal, atraumatic, no cyanosis or edema   Skin:   Skin color, texture, turgor normal, no rashes or lesions   Neurologic:   Grossly intact     Results Review:     LABS:  No results found for: \"WBC\", \"HGB\", \"HCT\", \"MCV\", \"PLT\", \"NEUTROABS\", \"GLUCOSE\", \"BUN\", \"CREATININE\", \"EGFRIFNONA\", \"EGFRIFAFRI\", \"NA\", \"K\", \"CL\", \"CO2\", \"MG\", \"PHOS\", \"CALCIUM\", \"ALBUMIN\", \"AST\", \"ALT\", \"BILITOT\"    RADIOLOGY:  Imaging Results (Last 72 Hours)       ** No results found for the last 72 hours. **            I reviewed the patient's new clinical results.      Impression: BPH      Plan: CYSTOSCOPY TRANSURETHRAL RESECTION OF PROSTATE LAST [place], APRN   10/11/2023   13:33 EDT  "

## 2023-10-11 NOTE — ANESTHESIA PREPROCEDURE EVALUATION
Anesthesia Evaluation     Patient summary reviewed and Nursing notes reviewed   no history of anesthetic complications:   NPO Solid Status: > 8 hours  NPO Liquid Status: > 8 hours           Airway   Mallampati: II  TM distance: >3 FB  Neck ROM: full  No difficulty expected  Dental      Pulmonary - normal exam   (+) asthma,  Cardiovascular - normal exam    (+) hypertension, hyperlipidemia      Neuro/Psych  GI/Hepatic/Renal/Endo      Musculoskeletal     Abdominal    Substance History      OB/GYN          Other                    Anesthesia Plan    ASA 2     general     intravenous induction     Anesthetic plan, risks, benefits, and alternatives have been provided, discussed and informed consent has been obtained with: patient.    Plan discussed with CRNA.    CODE STATUS:

## 2023-10-11 NOTE — OP NOTE
CYSTOSCOPY TRANSURETHRAL RESECTION OF PROSTATE GREENLIGHT  Progress Note    Serge Rogers  10/11/2023    Pre-op Diagnosis:   Benign prostatic hyperplasia with lower urinary symptoms       Post-Op Diagnosis Codes:  Benign prostatic hyperplasia with lower urinary symptoms    Procedure/CPTr Codes:  Procedure(s):  CYSTOSCOPY TRANSURETHRAL RESECTION OF PROSTATE GREENLIGHT    Surgeon(s):  Paco Gunderson MD    Anesthesia: General    Staff:   Circulator: Anjelica Alegria RN  Scrub Person: La Law  Vendor Representative: Rissa Saleh     Estimated Blood Loss:  40 ml    Urine Voided: * No values recorded between 10/11/2023  3:16 PM and 10/11/2023  5:05 PM *    Specimens:                None    Drains:   Urethral Catheter Double-lumen;Latex 22 Fr. (Active)   Site Assessment Clean;Skin intact 10/11/23 1645   Collection Container Standard drainage bag 10/11/23 1645   Securement Method Securing device 10/11/23 1645       Findings: Open prostatic urethra at conclusion of procedure    Complications: None    Procedure: Patient was correctly identified in the preoperative holding area.  Informed consent was obtained.  He is taken to the operative suite and positioned in supine position.  Anesthesia induced.  Once all appropriate lines and monitors were placed he was then repositioned to lithotomy position.  All pressure points padded.  Proper timeout procedure completed.  Preoperative antibiotics have been given.  The genitourinary area prepped and draped in normal sterile fashion.  Continuous-flow resectoscope was placed into the urinary bladder.  Cystoscopy performed identifying anatomic landmarks including bladder neck, ureteral orifices, and verumontanum.  Laser vaporization of prostate was performed at initial settings of 80 W around the bladder neck and superficially along the entire mucosa to be vaporized down to the level of the verumontanum.  Energy was subsequently increased to 120 W and later 140 W for  vaporization of prostatic adenoma from the bladder neck to the verumontanum with higher wattage is being within the prostatic urethra not near the bladder neck.  At conclusion of procedure patient was noted to have an open prostatic urethra.  Hemostasis confirmed.  A 20 Amharic two-way catheter with 30 mL in balloon was placed with irrigant light pink.  Lidocaine gel was instilled for local analgesia.  Patient tolerated procedure well.  He was woke from anesthesia uneventfully    Disposition: He is taken to PACU in stable condition.  He is discharged home with instructions for catheter removal in the office in approximately 2 days.        Paco Gunderson MD     Date: 10/11/2023  Time: 17:44 EDT

## 2023-10-11 NOTE — BRIEF OP NOTE
CYSTOSCOPY TRANSURETHRAL RESECTION OF PROSTATE GREENLIGHT  Progress Note    Serge Rogers  10/11/2023    Pre-op Diagnosis:   Benign prostatic hyperplasia with lower urinary symptoms       Post-Op Diagnosis Codes:  Benign prostatic hyperplasia with lower urinary symptoms    Procedure/CPTr Codes:        Procedure(s):  [facility]              Surgeon(s):  Paco Gunderson MD    Anesthesia: General    Staff:   Circulator: Anjelica Alegria RN  Scrub Person: La Law  Vendor Representative: Rissa Saleh         Estimated Blood Loss:  40 ml    Urine Voided: * No values recorded between 10/11/2023  3:16 PM and 10/11/2023  5:05 PM *    Specimens:                None          Drains:   Urethral Catheter Double-lumen;Latex 22 Fr. (Active)   Site Assessment Clean;Skin intact 10/11/23 1645   Collection Container Standard drainage bag 10/11/23 1645   Securement Method Securing device 10/11/23 1645       Findings: Open prostatic urethra at conclusion of procedure        Complications: None          Paco Gunderson MD     Date: 10/11/2023  Time: 17:40 EDT

## (undated) DEVICE — VLV SXN AIR/H2O ORCAPOD3 1P/U STRL

## (undated) DEVICE — Device

## (undated) DEVICE — PK CYSTO-TUR BASIC 10

## (undated) DEVICE — HYBRID TUBING/CAP SET FOR OLYMPUS® SCOPES: Brand: ERBE

## (undated) DEVICE — BLANKT WARM UPPR/BDY ARM/OUT 57X196CM

## (undated) DEVICE — CONMED SCOPE SAVER BITE BLOCK, 20X27 MM: Brand: SCOPE SAVER

## (undated) DEVICE — ST PRIM GRVTY NDLESS 3 INJ PORT 105IN

## (undated) DEVICE — SYRINGE,TOOMEY,IRRIGATION,70CC,STERILE: Brand: MEDLINE

## (undated) DEVICE — HLDR CATH FOLEY STATLOCK TRICOT PED 3WY

## (undated) DEVICE — DRAINBAG,ANTI-REFLUX TOWER,L/F,2000ML,LL: Brand: MEDLINE

## (undated) DEVICE — CATH FOL BARDEX 2WY 20F 30CC

## (undated) DEVICE — SUCTION CANISTER, 1500CC, RIGID: Brand: DEROYAL

## (undated) DEVICE — ENDOSCOPY PORT CONNECTOR FOR OLYMPUS® SCOPES: Brand: ERBE

## (undated) DEVICE — EVAC BLDR UROVAC W ADAPT

## (undated) DEVICE — DEFOGGER!" ANTI FOG KIT: Brand: DEROYAL

## (undated) DEVICE — FRCP BX RADJAW4 NDL 2.8 240 STD OG